# Patient Record
Sex: MALE | Race: OTHER | NOT HISPANIC OR LATINO
[De-identification: names, ages, dates, MRNs, and addresses within clinical notes are randomized per-mention and may not be internally consistent; named-entity substitution may affect disease eponyms.]

---

## 2023-03-06 ENCOUNTER — APPOINTMENT (OUTPATIENT)
Dept: OTOLARYNGOLOGY | Facility: CLINIC | Age: 48
End: 2023-03-06
Payer: COMMERCIAL

## 2023-03-06 DIAGNOSIS — Z87.11 PERSONAL HISTORY OF PEPTIC ULCER DISEASE: ICD-10-CM

## 2023-03-06 DIAGNOSIS — R51.9 HEADACHE, UNSPECIFIED: ICD-10-CM

## 2023-03-06 DIAGNOSIS — Z78.9 OTHER SPECIFIED HEALTH STATUS: ICD-10-CM

## 2023-03-06 DIAGNOSIS — J32.8 OTHER CHRONIC SINUSITIS: ICD-10-CM

## 2023-03-06 DIAGNOSIS — Z87.09 PERSONAL HISTORY OF OTHER DISEASES OF THE RESPIRATORY SYSTEM: ICD-10-CM

## 2023-03-06 DIAGNOSIS — R05.9 COUGH, UNSPECIFIED: ICD-10-CM

## 2023-03-06 DIAGNOSIS — Z82.5 FAMILY HISTORY OF ASTHMA AND OTHER CHRONIC LOWER RESPIRATORY DISEASES: ICD-10-CM

## 2023-03-06 DIAGNOSIS — Z80.9 FAMILY HISTORY OF MALIGNANT NEOPLASM, UNSPECIFIED: ICD-10-CM

## 2023-03-06 DIAGNOSIS — Z86.69 PERSONAL HISTORY OF OTHER DISEASES OF THE NERVOUS SYSTEM AND SENSE ORGANS: ICD-10-CM

## 2023-03-06 DIAGNOSIS — Z83.3 FAMILY HISTORY OF DIABETES MELLITUS: ICD-10-CM

## 2023-03-06 PROCEDURE — 31231 NASAL ENDOSCOPY DX: CPT

## 2023-03-06 PROCEDURE — 99203 OFFICE O/P NEW LOW 30 MIN: CPT | Mod: 25

## 2023-03-06 RX ORDER — OMEPRAZOLE 20 MG/1
TABLET, DELAYED RELEASE ORAL
Refills: 0 | Status: ACTIVE | COMMUNITY

## 2023-03-06 RX ORDER — PREDNISONE 10 MG
TABLET ORAL
Refills: 0 | Status: ACTIVE | COMMUNITY

## 2023-03-06 RX ORDER — AMOXICILLIN 875 MG/1
TABLET, FILM COATED ORAL
Refills: 0 | Status: ACTIVE | COMMUNITY

## 2023-03-06 NOTE — ASSESSMENT
[FreeTextEntry1] : He has a history of headaches, sinus pressure, blurry vision, flashes, floaters, and tinnitus.  He has been on antibiotics and steroids for possible sinusitis without improvement.  Nasal endoscopy showed a deviated septum.  There is no purulent drainage or congestion.  I discussed possible etiologies for symptoms.  He may have migraines.\par \par He has reflux and is on medication\par \par Plan\par -Findings and management options were discussed with the patient.\par -Nasal steroid spray, antihistamine or decongestant as needed\par -He will finish the antibiotics and steroids\par -I am sending him for CT scan of the sinuses.\par -I recommended neurology evaluation.  He was given the information for neurology at [facility].\par -Continue reflux precautions and medication for reflux\par -He was told to go to the emergency room if he has any worsening symptoms or concerns.\par -I will speak with him after the CT scan\par \par \par Addendum\par He had an MRI of the brain and IACs with contrast in 2013.  It showed retention cyst versus polyp in left maxillary sinus but was otherwise normal.  He saw Dr. Condon at the time for evaluation

## 2023-03-06 NOTE — CONSULT LETTER
[Dear  ___] : Dear  [unfilled], [Consult Letter:] : I had the pleasure of evaluating your patient, [unfilled]. [Please see my note below.] : Please see my note below. [Consult Closing:] : Thank you very much for allowing me to participate in the care of this patient.  If you have any questions, please do not hesitate to contact me. [Sincerely,] : Sincerely, [FreeTextEntry3] : Aleksandra Arreola MD\par

## 2023-03-06 NOTE — HISTORY OF PRESENT ILLNESS
[de-identified] : DARINEL STAHL is a 47 year old patient referred by his PCP for sinus evaluation.  For 3.5 months, he has had sinus problems with buzzing in the ears, blurry vision, sinus pain/pressure, twitching, eye floaters, flashers, headaches, and fevers.  He has not taken his temperature but feels it may be a low-grade.  He is currently on antibiotics and steroids without any improvement.  He has postnasal drip and reflux but no nasal congestion, nasal obstruction, or nasal drainage.\par \par He may have dust and pollen allergies although he has not been tested\par He does not get recurrent sinusitis\par No history of sinus surgery\par He denies a history of TMJ dysfunction\par He denies a history of smoking tobacco or marijuana\par He was seen by Dr. Yadav in 2006 for right-sided Bell's palsy.  That resolved with steroids and antiviral medication\par He has seen a neurologist in the past for twitching.  He had an MRI.

## 2023-03-07 ENCOUNTER — NON-APPOINTMENT (OUTPATIENT)
Age: 48
End: 2023-03-07

## 2023-03-17 ENCOUNTER — NON-APPOINTMENT (OUTPATIENT)
Age: 48
End: 2023-03-17

## 2024-05-10 ENCOUNTER — APPOINTMENT (OUTPATIENT)
Dept: OTOLARYNGOLOGY | Facility: CLINIC | Age: 49
End: 2024-05-10
Payer: COMMERCIAL

## 2024-05-10 DIAGNOSIS — K21.9 GASTRO-ESOPHAGEAL REFLUX DISEASE W/OUT ESOPHAGITIS: ICD-10-CM

## 2024-05-10 DIAGNOSIS — Z91.09 OTHER ALLERGY STATUS, OTHER THAN TO DRUGS AND BIOLOGICAL SUBSTANCES: ICD-10-CM

## 2024-05-10 DIAGNOSIS — H91.92 UNSPECIFIED HEARING LOSS, LEFT EAR: ICD-10-CM

## 2024-05-10 DIAGNOSIS — J34.2 DEVIATED NASAL SEPTUM: ICD-10-CM

## 2024-05-10 DIAGNOSIS — H93.12 TINNITUS, LEFT EAR: ICD-10-CM

## 2024-05-10 PROCEDURE — 31231 NASAL ENDOSCOPY DX: CPT

## 2024-05-10 PROCEDURE — 92557 COMPREHENSIVE HEARING TEST: CPT

## 2024-05-10 PROCEDURE — 92567 TYMPANOMETRY: CPT

## 2024-05-10 PROCEDURE — 99213 OFFICE O/P EST LOW 20 MIN: CPT | Mod: 25

## 2024-05-10 NOTE — PHYSICAL EXAM
[TextEntry] : General: The patient was alert, oriented and in no distress. Voice was clear.  Face: The patient had no facial asymmetry or mass. The skin was unremarkable.  Ears: Hearing normal to conversational voice External ears were normal without deformity. Ear canals were clear. No cerumen or inflammation Tympanic membranes were intact and normal. No perforation or effusion. mobile  Nose: The external nose had no significant deformity. There was no facial tenderness. On anterior rhinoscopy, the nasal mucosa was normal. The anterior septum was grossly midline. There were no visualized polyps, purulence or masses.  Oral cavity: Oral mucosa- normal. Oral and base of tongue- clear and without mass. Gingival and buccal mucosa- moist and without lesions. Palate- the palate moved well. There was no cleft palate. There appeared to be good salivary flow. Oral cavity/oropharynx- no pus, erythema or mass  Neck: The neck was symmetrical. The parotid and submandibular glands were normal without masses. The trachea was midline and there was no unusual crepitus. Thyroid was smooth and nontender and no masses were palpated. No masses  Lymphatics: Cervical adenopathy- none. Procedure     PROCEDURE: FLEXIBLE LARYNGOSCOPY, NASAL ENDOSCOPY  Surgeon: Dr. Arreola Indication: chronic postnasal drip, chronic rhinitis, inadequate exam on anterior rhinoscopy Anesthetic: Topical lidocaine and Afrin Procedure: The patient was placed in a sitting position. Following application of the topical anesthetic and decongestant, exam was performed with a flexible telescope. The scope was passed along the right nasal floor to the nasopharynx. It was then passed into the region of the middle meatus, middle turbinate, and sphenoethmoid region. An identical procedure was performed on the left side. The following findings were noted:  Nasal mucosa: Mild edema Septum: Deviated to the right  Right nasal cavity  Inferior turbinate: Mildly enlarged  Middle turbinate: normal  Superior turbinate: normal  Inferior meatus: no pus, polyps or congestion  Middle meatus: no pus, polyps or congestion  Superior meatus: no pus, polyps, or congestion  Sphenoethmoidal recess: no pus, polyps or congestion  Left nasal cavity  Inferior turbinate: Mildly enlarged  Middle turbinate: normal  Superior turbinate: normal  Inferior meatus: no pus, polyps or congestion  Middle meatus: no pus, polyps, or congestion  Superior meatus: no pus, polyps, or congestion  Sphenoethmoidal recess: no pus, polyps or congestion  Nasopharynx: no masses, choanae patent, no adenoid tissue  Base of tongue and vallecula: no masses or asymmetry Posterior pharyngeal wall: no masses. Hypopharynx: symmetrical. No masses Pyriform sinuses: no lesions or pooling of secretions Epiglottis: normal. No edema or lesions Aryepiglottic folds: normal. No lesions. True vocal cords: clear and mobile. No lesions. Airway patent False vocal cords: normal Ventricles: no masses. Arytenoids: Mild edema   Interarytenoid area: no masses.  moderate edema Subglottis: normal. no masses    AUDIOGRAM- test ordered and the results reviewed and discussed with the patient. [] Hearing-right ear-normal hearing.  Left ear-mild sensorineural hearing loss at 4000 and 8000 Hz Word recognition-normal Tympanograms- AD- type A, AS- type A

## 2024-05-10 NOTE — HISTORY OF PRESENT ILLNESS
[de-identified] : DARINEL STAHL is a 49 year old patient With a history of sinus congestion, left-sided tinnitus, blurred vision, twitching and spasms in the face and neck.  He is here for ENT follow-up.  He was seen last year.  He has since seen a neurologist and neuro-ophthalmologist.  He has noticed a buzzing in the left ear.  He has no otalgia, otorrhea, or dizziness.  He does have mild ear pressure.  He has postnasal drip but no significant nasal obstruction or congestion.  He is not using allergy or sinus medications.  He continues to have twitching involving the right side of his face.  He also has blurred vision.  CT scan done last year showed no significant sinus disease.  He also had an MRI of the brain and orbits with and without contrast.  There were no sinus findings reported.  ENT history He may have dust and pollen allergies.  He has not had testing No history of recurrent sinus infections or sinus surgery He denies a history of smoking He was seen by Dr. Yadav in 2006 for right-sided Bell's palsy. That resolved with steroids and antiviral medication He has reflux.  He has seen a gastroenterologist.  He is on a PPI. No history of ear disease  CT scan of the sinus 3/16/2023-deviated septum with minimal chronic mucosal thickening in the ethmoid sinuses and maxillary sinuses.  There was an incidental mucous retention cyst or polyp in the right maxillary sinus  MRI of the brain and MRI of the orbits with and without contrast 5/24/2023

## 2024-05-10 NOTE — ASSESSMENT
[FreeTextEntry1] : He has a history of chronic rhinitis with postnasal drip.  He does suffer from allergies and reflux.  CT scan and MRI performed last year did not show sinus disease.  Nasal endoscopy today showed a deviated septum and mild inferior turbinate hypertrophy.  There was no sinus infection  He has reflux.  This is likely contributing to the postnasal drip.  He complains of blurred vision spasms in the face and neck and twitching of the right side of his face.  This is not due to sinus disease.  He has an asymmetric left high-frequency sensorineural hearing loss.  This is likely causing the tinnitus.  Plan -Findings and management options were discussed with the patient. - Nasal saline irrigations, nasal steroid spray, and antihistamine as needed. - If Flonase does not help, he may try Atrovent - Reflux precautions recommended.  He was given literature.  He will take medication for reflux - Allergy evaluation recommended - I recommended he follow-up with his neurologist for his other symptoms - Good ear hygiene - Monitor hearing - Consider MRI with and without contrast of the IACs to rule out retrocochlear pathology.  We discussed this.  He is going to speak with his neurologist and PCP first - He was given literature regarding tinnitus management - He should see his PCP for management of a small subcutaneous lesion on his left upper arm.  He may need to see a dermatologist or general surgeon/orthopedic surgeon - I have asked him to check in with me after he sees the neurologist.  If he would like to proceed with the MRI, he should let me know. - I would also like to see him back if he has any concerns.  Otherwise, he may follow-up after the allergy evaluation

## 2024-05-14 PROBLEM — H91.92 HIGH-FREQUENCY HEARING LOSS OF LEFT EAR: Status: ACTIVE | Noted: 2024-05-14

## 2024-07-30 PROBLEM — Z86.69 HISTORY OF BELL'S PALSY: Status: RESOLVED | Noted: 2023-03-06 | Resolved: 2024-07-30

## 2024-09-30 ENCOUNTER — APPOINTMENT (OUTPATIENT)
Dept: OTOLARYNGOLOGY | Facility: CLINIC | Age: 49
End: 2024-09-30
Payer: COMMERCIAL

## 2024-09-30 DIAGNOSIS — H92.03 OTALGIA, BILATERAL: ICD-10-CM

## 2024-09-30 DIAGNOSIS — H91.92 UNSPECIFIED HEARING LOSS, LEFT EAR: ICD-10-CM

## 2024-09-30 DIAGNOSIS — K21.9 GASTRO-ESOPHAGEAL REFLUX DISEASE W/OUT ESOPHAGITIS: ICD-10-CM

## 2024-09-30 DIAGNOSIS — J34.2 DEVIATED NASAL SEPTUM: ICD-10-CM

## 2024-09-30 DIAGNOSIS — J32.8 OTHER CHRONIC SINUSITIS: ICD-10-CM

## 2024-09-30 DIAGNOSIS — J31.2 CHRONIC PHARYNGITIS: ICD-10-CM

## 2024-09-30 DIAGNOSIS — H93.A2 PULSATILE TINNITUS, LEFT EAR: ICD-10-CM

## 2024-09-30 DIAGNOSIS — Z91.09 OTHER ALLERGY STATUS, OTHER THAN TO DRUGS AND BIOLOGICAL SUBSTANCES: ICD-10-CM

## 2024-09-30 PROCEDURE — 99214 OFFICE O/P EST MOD 30 MIN: CPT | Mod: 25

## 2024-09-30 PROCEDURE — 92567 TYMPANOMETRY: CPT

## 2024-09-30 PROCEDURE — 92557 COMPREHENSIVE HEARING TEST: CPT

## 2024-09-30 PROCEDURE — 31575 DIAGNOSTIC LARYNGOSCOPY: CPT

## 2024-09-30 NOTE — PHYSICAL EXAM
[TextEntry] : General: The patient was alert, oriented and in no distress. Voice was clear.  Face: The patient had no facial asymmetry or mass. The skin was unremarkable.  Ears: Hearing normal to conversational voice External ears were normal without deformity. Ear canals were clear. No cerumen or inflammation Tympanic membranes were intact and normal. No perforation or effusion. mobile  Nose: The external nose had no significant deformity. There was no facial tenderness. On anterior rhinoscopy, the nasal mucosa was normal. The anterior septum was grossly midline. There were no visualized polyps, purulence or masses.  Oral cavity: Oral mucosa- normal. Oral and base of tongue- clear and without mass. Gingival and buccal mucosa- moist and without lesions. Palate- the palate moved well. There was no cleft palate. There appeared to be good salivary flow. Oral cavity/oropharynx- no pus, erythema or mass  Neck: The neck was symmetrical. The parotid and submandibular glands were normal without masses. The trachea was midline and there was no unusual crepitus. Thyroid was smooth and nontender and no masses were palpated. No masses  Lymphatics: Cervical adenopathy- none. Procedure  PROCEDURE: FLEXIBLE LARYNGOSCOPY, NASAL ENDOSCOPY  Surgeon: Dr. Arreola Indication: ear pain, reflux, inadequate transoral exam  Anesthetic: Topical lidocaine and Afrin Procedure: The patient was placed in a sitting position. Following application of the topical anesthetic and decongestant, exam was performed with a flexible telescope. The scope was passed along the right nasal floor to the nasopharynx. It was then passed into the region of the middle meatus, middle turbinate, and sphenoethmoid region. An identical procedure was performed on the left side. The following findings were noted:  Nasal mucosa: Mild edema Septum: Deviated to the right  Right nasal cavity Inferior turbinate: Mildly enlarged Middle turbinate: normal Superior turbinate: normal Inferior meatus: no pus, polyps or congestion Middle meatus: no pus, polyps or congestion Superior meatus: no pus, polyps, or congestion Sphenoethmoidal recess: no pus, polyps or congestion  Left nasal cavity Inferior turbinate: Mildly enlarged Middle turbinate: normal Superior turbinate: normal Inferior meatus: no pus, polyps or congestion Middle meatus: no pus, polyps, or congestion Superior meatus: no pus, polyps, or congestion Sphenoethmoidal recess: no pus, polyps or congestion  Nasopharynx: no masses, choanae patent, no adenoid tissue  Base of tongue and vallecula: no masses or asymmetry Posterior pharyngeal wall: no masses. Hypopharynx: symmetrical. No masses Pyriform sinuses: no lesions or pooling of secretions Epiglottis: normal. No edema or lesions Aryepiglottic folds: normal. No lesions. True vocal cords: clear and mobile. No lesions. Airway patent False vocal cords: normal Ventricles: no masses. Arytenoids: Mild edema Interarytenoid area: no masses. moderate edema Subglottis: normal. no masses

## 2024-09-30 NOTE — ASSESSMENT
[FreeTextEntry1] : He has bilateral ear pain.  This is referred otalgia.  The ears were normal on exam.  Flexible laryngoscopy showed no masses or lesions.  I discussed possible etiologies such as TMJ dysfunction, dental source, musculoskeletal sources, neuralgias, and lesions.  He also reports left pulsatile tinnitus.  He has a mild asymmetry in the left ear at 8000 Hz on the audiogram.  He has a history of chronic rhinitis, allergies, and reflux.  The throat irritation is likely due to reflux.  A culture was sent to rule out bacterial infection.  Plan -Findings and management options were discussed with the patient. - Continue good ear hygiene - Monitor hearing - I am sending him for an MRI with contrast of the IACs as well as an MRA/MRV of the head for workup for pulsatile tinnitus and asymmetric hearing - He may speak with his cardiologist and neurologist about the pulsatile tinnitus.  I have also recommended that he speak with his neurologist about the ear pain. - I also recommended that he see his dentist to rule out TMJ dysfunction or dental source - Allergy and sinus medications as needed.  He may consider allergy evaluation - Continue reflux precautions and medication for reflux.  He may try adding Pepcid at night to see if that helps - GI follow-up recommended - We also discussed obtaining a CT scan of the neck.  I will discuss this further with him when he returns - He should call for the culture results - Follow-up after the MRI - Call and return earlier if any problems or worsening symptoms

## 2024-09-30 NOTE — HISTORY OF PRESENT ILLNESS
[de-identified] : DARINEL STAHL is a 49 year old patient Seen urgently for same-day evaluation for bilateral ear pain and multiple ENT complaints.  He has also had a globus sensation and throat irritation.  He said that he has seen his neurologist.  He also said that he has seen a dentist.  He does have a history of allergies and reflux.  He is on a PPI in the morning.  He also has a history of nasal congestion, left-sided tinnitus, blurred vision, twitching and spasms in the face and neck.  Audiogram at his last visit showed a mild asymmetric left high-frequency sensorineural hearing loss.  He wanted to see the neurologist prior to considering an MRI with and without contrast of the IACs. He also has sinus congestion/pressure which is worse if he bends forward.  At the end of the visit, he also mentioned that he has left pulsatile tinnitus which has been present for 3 months.  ENT history He may have dust and pollen allergies. He has not had testing No history of recurrent sinus infections or sinus surgery He denies a history of smoking He was seen by Dr. Yadav in 2006 for right-sided Bell's palsy. That resolved with steroids and antiviral medication He has reflux. He has seen a gastroenterologist. He is on a PPI. No history of ear disease  CT scan of the sinus 3/16/2023-deviated septum with minimal chronic mucosal thickening in the ethmoid sinuses and maxillary sinuses. There was an incidental mucous retention cyst or polyp in the right maxillary sinus  MRI of the brain and MRI of the orbits with and without contrast 5/24/2023  MRI of the C-spine

## 2024-10-03 LAB — BACTERIA THROAT CULT: NORMAL

## 2024-10-04 ENCOUNTER — RESULT REVIEW (OUTPATIENT)
Age: 49
End: 2024-10-04

## 2024-10-12 ENCOUNTER — APPOINTMENT (OUTPATIENT)
Dept: MRI IMAGING | Facility: CLINIC | Age: 49
End: 2024-10-12

## 2024-10-12 ENCOUNTER — OUTPATIENT (OUTPATIENT)
Dept: OUTPATIENT SERVICES | Facility: HOSPITAL | Age: 49
LOS: 1 days | End: 2024-10-12

## 2024-10-12 PROCEDURE — 70553 MRI BRAIN STEM W/O & W/DYE: CPT | Mod: 26

## 2024-10-12 PROCEDURE — 70546 MR ANGIOGRAPH HEAD W/O&W/DYE: CPT | Mod: 26,76,59

## 2024-10-14 ENCOUNTER — APPOINTMENT (OUTPATIENT)
Dept: MRI IMAGING | Facility: CLINIC | Age: 49
End: 2024-10-14

## 2024-11-13 ENCOUNTER — APPOINTMENT (OUTPATIENT)
Dept: OTOLARYNGOLOGY | Facility: CLINIC | Age: 49
End: 2024-11-13

## 2024-12-06 ENCOUNTER — APPOINTMENT (OUTPATIENT)
Dept: OTOLARYNGOLOGY | Facility: CLINIC | Age: 49
End: 2024-12-06
Payer: COMMERCIAL

## 2024-12-06 ENCOUNTER — NON-APPOINTMENT (OUTPATIENT)
Age: 49
End: 2024-12-06

## 2024-12-06 VITALS — HEIGHT: 66 IN | WEIGHT: 165 LBS | BODY MASS INDEX: 26.52 KG/M2

## 2024-12-06 DIAGNOSIS — K21.9 GASTRO-ESOPHAGEAL REFLUX DISEASE W/OUT ESOPHAGITIS: ICD-10-CM

## 2024-12-06 DIAGNOSIS — Z91.09 OTHER ALLERGY STATUS, OTHER THAN TO DRUGS AND BIOLOGICAL SUBSTANCES: ICD-10-CM

## 2024-12-06 DIAGNOSIS — H92.03 OTALGIA, BILATERAL: ICD-10-CM

## 2024-12-06 DIAGNOSIS — J31.2 CHRONIC PHARYNGITIS: ICD-10-CM

## 2024-12-06 DIAGNOSIS — J34.2 DEVIATED NASAL SEPTUM: ICD-10-CM

## 2024-12-06 DIAGNOSIS — J32.8 OTHER CHRONIC SINUSITIS: ICD-10-CM

## 2024-12-06 DIAGNOSIS — H93.A2 PULSATILE TINNITUS, LEFT EAR: ICD-10-CM

## 2024-12-06 PROCEDURE — 99213 OFFICE O/P EST LOW 20 MIN: CPT

## 2024-12-06 RX ORDER — ROSUVASTATIN CALCIUM 5 MG/1
TABLET, FILM COATED ORAL
Refills: 0 | Status: ACTIVE | COMMUNITY

## 2024-12-06 RX ORDER — FAMOTIDINE 20 MG/1
20 TABLET, FILM COATED ORAL
Qty: 180 | Refills: 0 | Status: ACTIVE | COMMUNITY
Start: 2024-12-06 | End: 1900-01-01